# Patient Record
Sex: MALE | Race: WHITE | Employment: OTHER | URBAN - METROPOLITAN AREA
[De-identification: names, ages, dates, MRNs, and addresses within clinical notes are randomized per-mention and may not be internally consistent; named-entity substitution may affect disease eponyms.]

---

## 2019-07-17 ENCOUNTER — HOSPITAL ENCOUNTER (EMERGENCY)
Age: 34
Discharge: HOME | End: 2019-07-17

## 2019-07-17 ENCOUNTER — APPOINTMENT (OUTPATIENT)
Dept: GENERAL RADIOLOGY | Age: 34
End: 2019-07-17

## 2019-07-17 VITALS
SYSTOLIC BLOOD PRESSURE: 173 MMHG | WEIGHT: 315 LBS | HEIGHT: 72 IN | TEMPERATURE: 98.5 F | OXYGEN SATURATION: 97 % | RESPIRATION RATE: 18 BRPM | HEART RATE: 60 BPM | BODY MASS INDEX: 42.66 KG/M2 | DIASTOLIC BLOOD PRESSURE: 96 MMHG
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
## 2019-07-17 DIAGNOSIS — S39.012A STRAIN OF LUMBAR REGION, INITIAL ENCOUNTER: ICD-10-CM

## 2019-07-17 PROCEDURE — 72100 X-RAY EXAM L-S SPINE 2/3 VWS: CPT

## 2019-07-17 PROCEDURE — 99284 EMERGENCY DEPT VISIT MOD MDM: CPT

## 2019-07-17 RX ORDER — CYCLOBENZAPRINE HCL 10 MG
10 TABLET ORAL 3 TIMES DAILY PRN
Qty: 10 TABLET | Refills: 0 | Status: SHIPPED | OUTPATIENT
Start: 2019-07-17

## 2019-07-17 RX ORDER — NAPROXEN 500 MG/1
500 TABLET ORAL 2 TIMES DAILY
Qty: 14 TABLET | Refills: 0 | Status: SHIPPED | OUTPATIENT
Start: 2019-07-17 | End: 2019-07-24

## 2019-07-17 ASSESSMENT — PAIN DESCRIPTION - ORIENTATION: ORIENTATION: LOWER

## 2019-07-17 ASSESSMENT — PAIN SCALES - GENERAL: PAINLEVEL_OUTOF10: 5

## 2019-07-17 ASSESSMENT — PAIN DESCRIPTION - PAIN TYPE: TYPE: ACUTE PAIN

## 2019-07-17 ASSESSMENT — PAIN DESCRIPTION - DESCRIPTORS: DESCRIPTORS: ACHING

## 2019-07-17 ASSESSMENT — PAIN DESCRIPTION - LOCATION: LOCATION: BACK

## 2021-04-01 ENCOUNTER — HOSPITAL ENCOUNTER (EMERGENCY)
Age: 36
Discharge: HOME | End: 2021-04-01
Attending: EMERGENCY MEDICINE

## 2021-04-01 VITALS
HEART RATE: 54 BPM | SYSTOLIC BLOOD PRESSURE: 124 MMHG | HEIGHT: 72 IN | TEMPERATURE: 97 F | WEIGHT: 315 LBS | DIASTOLIC BLOOD PRESSURE: 79 MMHG | BODY MASS INDEX: 42.66 KG/M2 | RESPIRATION RATE: 14 BRPM | OXYGEN SATURATION: 97 %

## 2021-04-01 DIAGNOSIS — T50.Z95A ADVERSE EFFECT OF VACCINE, INITIAL ENCOUNTER: Primary | ICD-10-CM

## 2021-04-01 LAB
EKG ATRIAL RATE: 57 BPM
EKG P AXIS: 7 DEGREES
EKG P-R INTERVAL: 162 MS
EKG Q-T INTERVAL: 388 MS
EKG QRS DURATION: 102 MS
EKG QTC CALCULATION (BAZETT): 377 MS
EKG R AXIS: 50 DEGREES
EKG T AXIS: 24 DEGREES
EKG VENTRICULAR RATE: 57 BPM

## 2021-04-01 PROCEDURE — 99284 EMERGENCY DEPT VISIT MOD MDM: CPT

## 2021-04-01 PROCEDURE — 93005 ELECTROCARDIOGRAM TRACING: CPT | Performed by: NURSE PRACTITIONER

## 2021-04-01 NOTE — ED NOTES
Bed: 21  Expected date:   Expected time:   Means of arrival:   Comments:  Amador Vallejo RN  04/01/21 [id number]